# Patient Record
Sex: MALE | Race: WHITE
[De-identification: names, ages, dates, MRNs, and addresses within clinical notes are randomized per-mention and may not be internally consistent; named-entity substitution may affect disease eponyms.]

---

## 2017-11-14 ENCOUNTER — HOSPITAL ENCOUNTER (EMERGENCY)
Dept: HOSPITAL 47 - EC | Age: 27
Discharge: HOME | End: 2017-11-14
Payer: COMMERCIAL

## 2017-11-14 VITALS
DIASTOLIC BLOOD PRESSURE: 68 MMHG | RESPIRATION RATE: 16 BRPM | SYSTOLIC BLOOD PRESSURE: 123 MMHG | HEART RATE: 71 BPM | TEMPERATURE: 98.2 F

## 2017-11-14 DIAGNOSIS — Z91.018: ICD-10-CM

## 2017-11-14 DIAGNOSIS — Z79.899: ICD-10-CM

## 2017-11-14 DIAGNOSIS — R56.9: Primary | ICD-10-CM

## 2017-11-14 DIAGNOSIS — Z91.010: ICD-10-CM

## 2017-11-14 DIAGNOSIS — F17.200: ICD-10-CM

## 2017-11-14 LAB
ALP SERPL-CCNC: 62 U/L (ref 38–126)
ALT SERPL-CCNC: 23 U/L (ref 21–72)
ANION GAP SERPL CALC-SCNC: 6 MMOL/L
AST SERPL-CCNC: 15 U/L (ref 17–59)
BASOPHILS # BLD AUTO: 0 K/UL (ref 0–0.2)
BASOPHILS NFR BLD AUTO: 0 %
BUN SERPL-SCNC: 12 MG/DL (ref 9–20)
CALCIUM SPEC-MCNC: 9.7 MG/DL (ref 8.4–10.2)
CH: 32.2
CHCM: 35.4
CHLORIDE SERPL-SCNC: 105 MMOL/L (ref 98–107)
CO2 SERPL-SCNC: 26 MMOL/L (ref 22–30)
EOSINOPHIL # BLD AUTO: 0.3 K/UL (ref 0–0.7)
EOSINOPHIL NFR BLD AUTO: 5 %
ERYTHROCYTE [DISTWIDTH] IN BLOOD BY AUTOMATED COUNT: 4.85 M/UL (ref 4.3–5.9)
ERYTHROCYTE [DISTWIDTH] IN BLOOD: 11.3 % (ref 11.5–15.5)
GLUCOSE SERPL-MCNC: 108 MG/DL (ref 74–99)
HCT VFR BLD AUTO: 44.2 % (ref 39–53)
HDW: 2.66
HGB BLD-MCNC: 15.1 GM/DL (ref 13–17.5)
LUC NFR BLD AUTO: 1 %
LYMPHOCYTES # SPEC AUTO: 1.3 K/UL (ref 1–4.8)
LYMPHOCYTES NFR SPEC AUTO: 23 %
MCH RBC QN AUTO: 31.2 PG (ref 25–35)
MCHC RBC AUTO-ENTMCNC: 34.2 G/DL (ref 31–37)
MCV RBC AUTO: 91.1 FL (ref 80–100)
MONOCYTES # BLD AUTO: 0.2 K/UL (ref 0–1)
MONOCYTES NFR BLD AUTO: 4 %
NEUTROPHILS # BLD AUTO: 4 K/UL (ref 1.3–7.7)
NEUTROPHILS NFR BLD AUTO: 67 %
NON-AFRICAN AMERICAN GFR(MDRD): >60
POTASSIUM SERPL-SCNC: 4.6 MMOL/L (ref 3.5–5.1)
PROT SERPL-MCNC: 6.1 G/DL (ref 6.3–8.2)
SODIUM SERPL-SCNC: 137 MMOL/L (ref 137–145)
WBC # BLD AUTO: 0.08 10*3/UL
WBC # BLD AUTO: 5.9 K/UL (ref 3.8–10.6)
WBC (PEROX): 5.83

## 2017-11-14 PROCEDURE — 36415 COLL VENOUS BLD VENIPUNCTURE: CPT

## 2017-11-14 PROCEDURE — 85025 COMPLETE CBC W/AUTO DIFF WBC: CPT

## 2017-11-14 PROCEDURE — 96361 HYDRATE IV INFUSION ADD-ON: CPT

## 2017-11-14 PROCEDURE — 80053 COMPREHEN METABOLIC PANEL: CPT

## 2017-11-14 PROCEDURE — 96374 THER/PROPH/DIAG INJ IV PUSH: CPT

## 2017-11-14 PROCEDURE — 99284 EMERGENCY DEPT VISIT MOD MDM: CPT

## 2017-11-14 NOTE — ED
General Adult HPI





- General


Chief complaint: Seizure


Stated complaint: Had two Seizures


Time Seen by Provider: 11/14/17 10:59


Source: patient, RN notes reviewed


Mode of arrival: wheelchair


Limitations: no limitations





- History of Present Illness


Initial comments: 





Patient will be 26-year-old male with significant past medical history for 

seizures, who presents emergency room today with chief complaint of seizure 

that occurred hour and Half ago.  Patient does admit that he was roommate was 

present.  Patient admits that he also had a seizure late last night when he was 

sitting a chair.  Was witnessed by roommates.  Patient believes that he hit his 

lower lip.  He does admit that he feels headache now and feels very tired.  He 

states is consistent with seizures that is had in the past.  He states it no 

longer on seizure medication as was taken off medication approximately 6 months 

ago by his neurologist who is out of [place].  He states he recently moved 

back to the area here. Patient denies any recent fever, chills, shortness of 

breath, chest pain, back pain, abdominal pain, nausea or vomiting, numbness or 

tingling, dysuria or hematuria, constipation or diarrhea, visual changes, or 

any other complaints.





- Related Data


 Home Medications











 Medication  Instructions  Recorded  Confirmed


 


Dextroamphetamine/Amphetamine 5 mg PO BID 11/14/17 11/14/17





[Adderall]   








 Previous Rx's











 Medication  Instructions  Recorded


 


Ethosuximide [Zarontin] 250 mg PO DAILY #14 capsule 11/14/17


 


LORazepam [Ativan] 1 mg PO TID #10 tablet 11/14/17











 Allergies











Allergy/AdvReac Type Severity Reaction Status Date / Time


 


nut - unspecified Allergy  Rash/Hives Verified 11/14/17 11:21


 


peanut Allergy  Rash/Hives Verified 11/14/17 11:21














Review of Systems


ROS Statement: 


Those systems with pertinent positive or pertinent negative responses have been 

documented in the HPI.





ROS Other: All systems not noted in ROS Statement are negative.





Past Medical History


Past Medical History: Seizure Disorder


History of Any Multi-Drug Resistant Organisms: None Reported


Additional Past Surgical History / Comment(s): nasal surgery


Past Psychological History: No Psychological Hx Reported


Smoking Status: Current every day smoker


Past Alcohol Use History: None Reported


Past Drug Use History: None Reported





General Exam





- General Exam Comments


Initial Comments: 





General:  The patient is awake and alert, in no distress, and does not appear 

acutely ill. 


Eye:  Pupils are equal, round and reactive to light, extra-ocular movements are 

intact.  No nystagmus.  There is normal conjunctiva bilaterally.  No signs of 

icterus.  


Ears, nose, mouth and throat:  There are moist mucous membranes and no oral 

lesions. 


Neck:  The neck is supple, there is no tenderness or JVD.  


Cardiovascular:  There is a regular rate and rhythm. No murmur, rub or gallop 

is appreciated.


Respiratory:  Lungs are clear to auscultation, respirations are non-labored, 

breath sounds are equal.  No wheezes, stridor, rales, or rhonchi.


Gastrointestinal:  Soft, non-distended, non-tender abdomen without masses or 

organomegaly noted. There is no rebound or guarding present.  No CVA 

tenderness. Bowel sounds are unremarkable.


Musculoskeletal:  Normal ROM, no tenderness.  Strength 5/5. Sensation intact. 

Pulses equal bilaterally 2+.  


Neurological:  A&O x 3. CN II-XII intact, There are no obvious motor or sensory 

deficits. Coordination appears grossly intact. Speech is normal.


Skin:  Skin is warm and dry and no rashes or lesions are noted. 


Psychiatric:  Cooperative, appropriate mood & affect, normal judgment.  


Limitations: no limitations





Course


 Vital Signs











  11/14/17 11/14/17 11/14/17





  10:55 11:51 12:17


 


Temperature 97.2 F L 98.5 F 


 


Pulse Rate 83 72 87


 


Respiratory 16 18 26 H





Rate   


 


Blood Pressure 138/86 115/59 152/78


 


O2 Sat by Pulse 100 100 100





Oximetry   














  11/14/17





  13:10


 


Temperature 98.2 F


 


Pulse Rate 71


 


Respiratory 16





Rate 


 


Blood Pressure 123/68


 


O2 Sat by Pulse 98





Oximetry 














Medical Decision Making





- Medical Decision Making





Case discussed in detail with attending physician Dr. Rice.  Patient 

reexamined at this time shows no signs of distress.  He does not that he's 

feeling better here in the emergency room.  He doesn't that the symptoms are 

consistent with seizures that is had in the past.  Admits that his headache is 

improved.  States vision is completely back to normal at this time.  Patient 

will be given dose of Ativan.  We are going to order his home medication of his 

anticonvulsant used is not hospital.  Given prescription in taking this.  He is 

advised follow-up with urologist over the next 2 days.  Also be given a short 

prescription of Ativan use if he has any difficulty getting his medication.  

Advised that he is not allowed until cleared by his family doctor or 

neurologist to return to emergency room symptoms increase worsen or for any 

other concerns.  Patient and family friend at bedside states understanding and 

is in agreement.





- Lab Data


Result diagrams: 


 11/14/17 11:20





 11/14/17 11:20


 Lab Results











  11/14/17 11/14/17 Range/Units





  11:20 11:20 


 


WBC  5.9   (3.8-10.6)  k/uL


 


RBC  4.85   (4.30-5.90)  m/uL


 


Hgb  15.1   (13.0-17.5)  gm/dL


 


Hct  44.2   (39.0-53.0)  %


 


MCV  91.1   (80.0-100.0)  fL


 


MCH  31.2   (25.0-35.0)  pg


 


MCHC  34.2   (31.0-37.0)  g/dL


 


RDW  11.3 L   (11.5-15.5)  %


 


Plt Count  295   (150-450)  k/uL


 


Neutrophils %  67   %


 


Lymphocytes %  23   %


 


Monocytes %  4   %


 


Eosinophils %  5   %


 


Basophils %  0   %


 


Neutrophils #  4.0   (1.3-7.7)  k/uL


 


Lymphocytes #  1.3   (1.0-4.8)  k/uL


 


Monocytes #  0.2   (0-1.0)  k/uL


 


Eosinophils #  0.3   (0-0.7)  k/uL


 


Basophils #  0.0   (0-0.2)  k/uL


 


Sodium   137  (137-145)  mmol/L


 


Potassium   4.6  (3.5-5.1)  mmol/L


 


Chloride   105  ()  mmol/L


 


Carbon Dioxide   26  (22-30)  mmol/L


 


Anion Gap   6  mmol/L


 


BUN   12  (9-20)  mg/dL


 


Creatinine   0.94  (0.66-1.25)  mg/dL


 


Est GFR (MDRD) Af Amer   >60  (>60 ml/min/1.73 sqM)  


 


Est GFR (MDRD) Non-Af   >60  (>60 ml/min/1.73 sqM)  


 


Glucose   108 H  (74-99)  mg/dL


 


Calcium   9.7  (8.4-10.2)  mg/dL


 


Total Bilirubin   0.9  (0.2-1.3)  mg/dL


 


AST   15 L  (17-59)  U/L


 


ALT   23  (21-72)  U/L


 


Alkaline Phosphatase   62  ()  U/L


 


Total Protein   6.1 L  (6.3-8.2)  g/dL


 


Albumin   3.9  (3.5-5.0)  g/dL














Disposition


Clinical Impression: 


 Seizure





Disposition: HOME SELF-CARE


Condition: Good


Instructions:  Epilepsy (ED)


Additional Instructions: 


Please use medication as discussed.  Please follow-up with neurologist/family 

doctor in the next 2 days.  Please return to emergency room if the symptoms 

increase or worsen or for any other concerns.


Prescriptions: 


Ethosuximide [Zarontin] 250 mg PO DAILY #14 capsule


LORazepam [Ativan] 1 mg PO TID #10 tablet


Referrals: 


Nonstaff,Physician [Primary Care Provider] - 1-2 days


Time of Disposition: 13:33

## 2017-11-15 ENCOUNTER — HOSPITAL ENCOUNTER (EMERGENCY)
Dept: HOSPITAL 47 - EC | Age: 27
Discharge: HOME | End: 2017-11-15
Payer: COMMERCIAL

## 2017-11-15 VITALS
DIASTOLIC BLOOD PRESSURE: 67 MMHG | HEART RATE: 54 BPM | SYSTOLIC BLOOD PRESSURE: 127 MMHG | RESPIRATION RATE: 18 BRPM | TEMPERATURE: 99.8 F

## 2017-11-15 DIAGNOSIS — Z91.010: ICD-10-CM

## 2017-11-15 DIAGNOSIS — R56.9: Primary | ICD-10-CM
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
## 2017-11-15 DIAGNOSIS — Z79.899: ICD-10-CM

## 2017-11-15 DIAGNOSIS — S00.81XA: ICD-10-CM
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
## 2017-11-15 DIAGNOSIS — Z91.018: ICD-10-CM

## 2017-11-15 DIAGNOSIS — F17.200: ICD-10-CM

## 2017-11-15 PROCEDURE — 70450 CT HEAD/BRAIN W/O DYE: CPT

## 2017-11-15 PROCEDURE — 99284 EMERGENCY DEPT VISIT MOD MDM: CPT

## 2017-11-15 NOTE — ED
General Adult HPI





- General


Chief complaint: Seizure


Stated complaint: Seizure


Time Seen by Provider: 11/15/17 14:50


Source: patient, family, RN notes reviewed


Mode of arrival: wheelchair


Limitations: no limitations





- History of Present Illness


Initial comments: 





This is a 26-year-old male who comes into the emergency department with a past 

medical history significant for seizures.  Patient states she was taken off his 

seizure medication because he hadn't had a seizure in one year.  Patient states 

yesterday he had a seizure and he had one the day before as well.  Patient 

states today he had another seizure.  Patient states it lasted about a minute 

and his girlfriend stated that he had another one on the way here that lasted 

about 20 seconds and he was post ictal for about 10 seconds.  Patient states he 

did hit his head at home and is a little abrasion on the left side of his 

forehead.  Patient states he was given Ativan and Zarontin yesterday and he 

states he started that medication yesterday.  Patient states he did not yet 

make an appointment with his neurologist.  Patient denies any recent fever 

chills.  Patient denies any chest pain palpitations difficulty breathing or 

cough.  Patient denies any abdominal pain.  Patient denies any nausea vomiting 

diarrhea





- Related Data


 Home Medications











 Medication  Instructions  Recorded  Confirmed


 


Dextroamphetamine/Amphetamine 5 mg PO BID 11/14/17 11/15/17





[Adderall]   








 Previous Rx's











 Medication  Instructions  Recorded


 


Ethosuximide [Zarontin] 250 mg PO DAILY #14 capsule 11/14/17


 


LORazepam [Ativan] 1 mg PO TID #10 tablet 11/14/17











 Allergies











Allergy/AdvReac Type Severity Reaction Status Date / Time


 


nut - unspecified Allergy  Rash/Hives Verified 11/15/17 14:52


 


peanut Allergy  Rash/Hives Verified 11/15/17 14:52














Review of Systems


ROS Statement: 


Those systems with pertinent positive or pertinent negative responses have been 

documented in the HPI.





ROS Other: All systems not noted in ROS Statement are negative.





Past Medical History


Past Medical History: Seizure Disorder


History of Any Multi-Drug Resistant Organisms: None Reported


Additional Past Surgical History / Comment(s): nasal surgery


Past Psychological History: No Psychological Hx Reported


Smoking Status: Current every day smoker


Past Alcohol Use History: None Reported


Past Drug Use History: None Reported





General Exam





- General Exam Comments


Initial Comments: 





GENERAL:


Patient is well-developed and well-nourished.  Patient is nontoxic and well-

hydrated and is in mild distress.





ENT:


Neck is soft and supple.  No significant lymphadenopathy is noted.  Oropharynx 

is clear.  Moist mucous membranes.  Neck has full range of motion without 

eliciting any pain.





EYES:


The sclera were anicteric and conjunctiva were pink and moist.  Extraocular 

movements were intact and pupils were equal round and reactive to light.  

Eyelids were unremarkable.





PULMONARY:


Unlabored respirations.  Good breath sounds bilaterally.  No audible rales 

rhonchi or wheezing was noted.





CARDIOVASCULAR:


There is a regular rate and rhythm without any murmurs gallops or rubs. 





ABDOMEN:


Soft and nontender with normal bowel sounds.  No palpable organomegaly was 

noted.  There is no palpable pulsatile mass.





SKIN:


Patient has a superficial abrasion to the left side of his forehead





NEUROLOGIC:


Patient is alert and oriented x3.  Cranial nerves II through XII are grossly 

intact.  Motor and sensory are also intact.  Normal speech, volume and content.

  Symmetrical smile.  





MUSCULOSKELETAL:


Normal extremities with adequate strength and full range of motion.  No lower 

extremity swelling or edema.  No calf tenderness.





LYMPHATICS:


No significant lymphadenopathy is noted





PSYCHIATRIC:


Normal psychiatric evaluation.  


Limitations: no limitations





Course


 Vital Signs











  11/15/17





  14:49


 


Temperature 98.4 F


 


Pulse Rate 80


 


Respiratory 16





Rate 


 


Blood Pressure 147/81


 


O2 Sat by Pulse 100





Oximetry 














Medical Decision Making





- Medical Decision Making





Patient cannot drive until cleared by neurologist.  I spoke with the patient 

about this and he was in agreement





Disposition


Clinical Impression: 


 Seizure





Disposition: HOME SELF-CARE


Instructions:  Recurrent Seizures in Adults (ED)


Additional Instructions: 


Patient should follow-up with his neurologist as soon as possible.


Referrals: 


None,Stated [REFERRING] - 1-2 days


Time of Disposition: 16:30

## 2017-11-15 NOTE — CT
EXAMINATION TYPE: CT brain wo con

 

DATE OF EXAM: 11/15/2017

 

COMPARISON: NONE

 

HISTORY: Seizure

 

CT DLP: 1100 mGycm.  Automated Exposure Control for Dose Reduction was Utilized.

 

 

TECHNIQUE: CT scan of the head is performed without contrast.

 

FINDINGS:   There is no acute intracranial hemorrhage, mass effect, or midline shift identified.  The
 ventricles and sulci are within normal limits in size.  The globes are intact and the visualized sin
uses are clear. Changes of chronic sinusitis noted.

 

IMPRESSION:  No acute intracranial hemorrhage, mass effect, or midline shift is seen.

## 2018-06-30 ENCOUNTER — HOSPITAL ENCOUNTER (EMERGENCY)
Dept: HOSPITAL 47 - EC | Age: 28
LOS: 1 days | Discharge: HOME | End: 2018-07-01
Payer: COMMERCIAL

## 2018-06-30 VITALS — RESPIRATION RATE: 18 BRPM

## 2018-06-30 DIAGNOSIS — R06.03: ICD-10-CM

## 2018-06-30 DIAGNOSIS — F17.200: ICD-10-CM

## 2018-06-30 DIAGNOSIS — Z79.899: ICD-10-CM

## 2018-06-30 DIAGNOSIS — Z91.010: ICD-10-CM

## 2018-06-30 DIAGNOSIS — Z91.018: ICD-10-CM

## 2018-06-30 DIAGNOSIS — T78.40XA: Primary | ICD-10-CM

## 2018-06-30 PROCEDURE — 99285 EMERGENCY DEPT VISIT HI MDM: CPT

## 2018-06-30 PROCEDURE — 96375 TX/PRO/DX INJ NEW DRUG ADDON: CPT

## 2018-06-30 PROCEDURE — 96372 THER/PROPH/DIAG INJ SC/IM: CPT

## 2018-06-30 PROCEDURE — 96361 HYDRATE IV INFUSION ADD-ON: CPT

## 2018-06-30 PROCEDURE — 96374 THER/PROPH/DIAG INJ IV PUSH: CPT

## 2018-07-01 VITALS — HEART RATE: 90 BPM | SYSTOLIC BLOOD PRESSURE: 140 MMHG | TEMPERATURE: 98 F | DIASTOLIC BLOOD PRESSURE: 65 MMHG

## 2018-07-01 NOTE — ED
SOB HPI





- General


Chief Complaint: Shortness of Breath


Stated Complaint: Allergic reaction


Time Seen by Provider: 06/30/18 22:07


Source: patient


Mode of arrival: wheelchair


Limitations: physical limitation





- History of Present Illness


Initial Comments: 


2 is 7 years old male comes in with shortness of breath he developed an 

ALLERGIC reaction he was doing mowing the chart all of a sudden became so 

shortness of breath that she couldn't even talk on arrival he was not able to 

talk no trauma he does have a history of ALLERGIES to peanuts


.





- Related Data


 Home Medications











 Medication  Instructions  Recorded  Confirmed


 


Dextroamphetamine/Amphetamine 5 mg PO BID 11/14/17 11/15/17





[Adderall]   








 Previous Rx's











 Medication  Instructions  Recorded


 


Ethosuximide [Zarontin] 250 mg PO DAILY #14 capsule 11/14/17


 


LORazepam [Ativan] 1 mg PO TID #10 tablet 11/14/17


 


Ranitidine HCl [Zantac] 150 mg PO BID #10 tab 07/01/18


 


predniSONE 50 mg PO DAILY #5 tablet 07/01/18











 Allergies











Allergy/AdvReac Type Severity Reaction Status Date / Time


 


nut - unspecified Allergy  Rash/Hives Verified 06/30/18 22:16


 


peanut Allergy  Rash/Hives Verified 06/30/18 22:16














Review of Systems


ROS Statement: 


Those systems with pertinent positive or pertinent negative responses have been 

documented in the HPI.





ROS Other: All systems not noted in ROS Statement are negative.





Past Medical History


Past Medical History: Asthma, Seizure Disorder


History of Any Multi-Drug Resistant Organisms: None Reported


Additional Past Surgical History / Comment(s): nasal surgery


Past Psychological History: No Psychological Hx Reported


Smoking Status: Current every day smoker


Past Alcohol Use History: None Reported, Rare


Past Drug Use History: None Reported





General Exam





- General Exam Comments


Initial Comments: 


General:  The patient is awake and severe distress unable to talk. 


Skin:  Skin is warm and dry and no rashes or lesions are noted. 


Eye:  Pupils are equal, round and reactive to light, extra-ocular movements are 

intact; there is normal conjunctiva bilaterally.  


Ears, nose, mouth and throat:  There are moist mucous membranes and no oral 

lesions. 


Neck:  The neck is supple, there is no tenderness  or JVD.  


Cardiovascular:  There is a regular rate and rhythm. No murmur, rub or gallop 

is appreciated.


Respiratory: To auscultation bilateral, no wheezing no rhonchi no distress  

respiratory wise noticed


Gastrointestinal:  Soft, non-distended, non-tender abdomen without masses or 

organomegaly noted. There is no rebound or guarding present. Bowel sounds are 

unremarkable. 


Back:  There is no tenderness to palpation in the midline. There is no obvious 

deformity.


Musculoskeletal:  Normal ROM, no tenderness, There is no pedal edema. There is 

no calf tenderness or swelling. No cords were appreciated.  


Neurological:  CN II-XII intact, Cranial nerves III through XII are intact. 

There are no obvious motor or sensory deficits. Coordination appears grossly 

intact. Speech is normal.


Psychiatric:  Cooperative, appropriate mood & affect, normal judgment.  








Limitations: physical limitation





Course





 Vital Signs











  06/30/18 06/30/18 06/30/18





  22:04 22:08 22:25


 


Pulse Rate 99 90 86


 


Respiratory 28 H 30 H 18





Rate   


 


Blood Pressure 164/83 164/83 143/74


 


O2 Sat by Pulse 100 100 100





Oximetry   














  06/30/18 07/01/18 07/01/18





  23:31 00:17 00:34


 


Pulse Rate 83 77 


 


Respiratory 18 18 18





Rate   


 


Blood Pressure 130/69 116/67 


 


O2 Sat by Pulse 99 99 100





Oximetry   














  07/01/18





  01:11


 


Pulse Rate 86


 


Respiratory 18





Rate 


 


Blood Pressure 153/81


 


O2 Sat by Pulse 97





Oximetry 














Critical Care Time


Total Critical Care Time: 30


Critical Care Time: 


On arrival he was shown so short winded that he couldn't even talk, he was not 

moving any air at all he was using accessory muscles at that point we gave him 

a appendectomy 0.5 intramuscular Solu-Medrol, Benadryl was given IV along with 

Pepcid IV fluids were given that improved his condition within the next 20 

minutes then we observed him for couple of hours in the ER to make sure there 

is no distal lateral delayed ALLERGIC reaction he was reassessed at 1 AM he is 

feeling back to himself he be gone home on prednisone 50 mg daily for next 5 

days Zantac 150 mg twice daily for next 5 days and Claritin 10 mg by mouth 

daily for next 10 days








Disposition


Clinical Impression: 


 Allergic reaction, Respiratory distress





Disposition: HOME SELF-CARE


Condition: Good


Instructions:  Allergies (ED)


Prescriptions: 


predniSONE 50 mg PO DAILY #5 tablet


Ranitidine HCl [Zantac] 150 mg PO BID #10 tab


Is patient prescribed a controlled substance at d/c from ED?: No


Referrals: 


Nonstaff,Physician [Primary Care Provider] - 1-2 days